# Patient Record
Sex: MALE | Race: WHITE | Employment: OTHER | URBAN - METROPOLITAN AREA
[De-identification: names, ages, dates, MRNs, and addresses within clinical notes are randomized per-mention and may not be internally consistent; named-entity substitution may affect disease eponyms.]

---

## 2017-06-13 ENCOUNTER — HOSPITAL ENCOUNTER (OUTPATIENT)
Dept: SURGERY | Age: 82
Discharge: HOME | End: 2017-06-13
Payer: MEDICARE

## 2017-06-13 VITALS
SYSTOLIC BLOOD PRESSURE: 168 MMHG | DIASTOLIC BLOOD PRESSURE: 103 MMHG | HEIGHT: 72 IN | OXYGEN SATURATION: 93 % | BODY MASS INDEX: 24.72 KG/M2 | RESPIRATION RATE: 16 BRPM | HEART RATE: 79 BPM | WEIGHT: 182.5 LBS | TEMPERATURE: 97.7 F

## 2017-06-13 LAB
AMORPH CRY URNS QL MICRO: NORMAL
ANION GAP BLD CALC-SCNC: 7 MMOL/L (ref 7–16)
APPEARANCE UR: ABNORMAL
BACTERIA URNS QL MICRO: NORMAL /HPF
BILIRUB UR QL: NEGATIVE
BUN SERPL-MCNC: 16 MG/DL (ref 8–23)
CALCIUM SERPL-MCNC: 9 MG/DL (ref 8.3–10.4)
CASTS URNS QL MICRO: 0 /LPF
CHLORIDE SERPL-SCNC: 100 MMOL/L (ref 98–107)
CO2 SERPL-SCNC: 30 MMOL/L (ref 21–32)
COLOR UR: ABNORMAL
CREAT SERPL-MCNC: 0.68 MG/DL (ref 0.8–1.5)
CRYSTALS URNS QL MICRO: NORMAL /LPF
EPI CELLS #/AREA URNS HPF: NORMAL /HPF
ERYTHROCYTE [DISTWIDTH] IN BLOOD BY AUTOMATED COUNT: 13.7 % (ref 11.9–14.6)
GLUCOSE SERPL-MCNC: 125 MG/DL (ref 65–100)
GLUCOSE UR STRIP.AUTO-MCNC: NEGATIVE MG/DL
HCT VFR BLD AUTO: 43.2 % (ref 41.1–50.3)
HGB BLD-MCNC: 14.3 G/DL (ref 13.6–17.2)
HGB UR QL STRIP: ABNORMAL
KETONES UR QL STRIP.AUTO: NEGATIVE MG/DL
LEUKOCYTE ESTERASE UR QL STRIP.AUTO: ABNORMAL
MCH RBC QN AUTO: 30.5 PG (ref 26.1–32.9)
MCHC RBC AUTO-ENTMCNC: 33.1 G/DL (ref 31.4–35)
MCV RBC AUTO: 92.1 FL (ref 79.6–97.8)
MUCOUS THREADS URNS QL MICRO: 0 /LPF
NITRITE UR QL STRIP.AUTO: NEGATIVE
OTHER OBSERVATIONS,UCOM: NORMAL
PH UR STRIP: 5.5 [PH] (ref 5–9)
PLATELET # BLD AUTO: 263 K/UL (ref 150–450)
PMV BLD AUTO: 12 FL (ref 10.8–14.1)
POTASSIUM SERPL-SCNC: 3.7 MMOL/L (ref 3.5–5.1)
PROT UR STRIP-MCNC: 30 MG/DL
RBC # BLD AUTO: 4.69 M/UL (ref 4.23–5.67)
RBC #/AREA URNS HPF: NORMAL /HPF
SODIUM SERPL-SCNC: 137 MMOL/L (ref 136–145)
SP GR UR REFRACTOMETRY: 1.02 (ref 1–1.02)
UROBILINOGEN UR QL STRIP.AUTO: 0.2 EU/DL (ref 0.2–1)
WBC # BLD AUTO: 3.8 K/UL (ref 4.3–11.1)
WBC URNS QL MICRO: NORMAL /HPF

## 2017-06-13 PROCEDURE — 81001 URINALYSIS AUTO W/SCOPE: CPT | Performed by: UROLOGY

## 2017-06-13 PROCEDURE — 87086 URINE CULTURE/COLONY COUNT: CPT | Performed by: UROLOGY

## 2017-06-13 PROCEDURE — 80048 BASIC METABOLIC PNL TOTAL CA: CPT | Performed by: UROLOGY

## 2017-06-13 PROCEDURE — 81015 MICROSCOPIC EXAM OF URINE: CPT | Performed by: UROLOGY

## 2017-06-13 PROCEDURE — 85027 COMPLETE CBC AUTOMATED: CPT | Performed by: UROLOGY

## 2017-06-13 PROCEDURE — 93005 ELECTROCARDIOGRAM TRACING: CPT | Performed by: ANESTHESIOLOGY

## 2017-06-13 NOTE — PERIOP NOTES
Recent Results (from the past 12 hour(s))   URINALYSIS W/ RFLX MICROSCOPIC    Collection Time: 06/13/17  9:20 AM   Result Value Ref Range    Color MECHELLE      Appearance CLOUDY      Specific gravity 1.021 1.001 - 1.023      pH (UA) 5.5 5.0 - 9.0      Protein 30 (A) NEG mg/dL    Glucose NEGATIVE  mg/dL    Ketone NEGATIVE  NEG mg/dL    Bilirubin NEGATIVE  NEG      Blood LARGE (A) NEG      Urobilinogen 0.2 0.2 - 1.0 EU/dL    Nitrites NEGATIVE  NEG      Leukocyte Esterase SMALL (A) NEG     URINE MICROSCOPIC    Collection Time: 06/13/17  9:20 AM   Result Value Ref Range    WBC 10-20 0 /hpf    RBC 10-20 0 /hpf    Epithelial cells 0-3 0 /hpf    Bacteria TRACE 0 /hpf    Casts 0 0 /lpf    Crystals, urine CA OXALATE 0 /LPF    Amorphous Crystals TRACE 0      Mucus 0 0 /lpf    Other observations RESULTS VERIFIED MANUALLY     CBC W/O DIFF    Collection Time: 06/13/17  9:25 AM   Result Value Ref Range    WBC 3.8 (L) 4.3 - 11.1 K/uL    RBC 4.69 4.23 - 5.67 M/uL    HGB 14.3 13.6 - 17.2 g/dL    HCT 43.2 41.1 - 50.3 %    MCV 92.1 79.6 - 97.8 FL    MCH 30.5 26.1 - 32.9 PG    MCHC 33.1 31.4 - 35.0 g/dL    RDW 13.7 11.9 - 14.6 %    PLATELET 393 372 - 670 K/uL    MPV 12.0 10.8 - 06.7 FL   METABOLIC PANEL, BASIC    Collection Time: 06/13/17  9:25 AM   Result Value Ref Range    Sodium 137 136 - 145 mmol/L    Potassium 3.7 3.5 - 5.1 mmol/L    Chloride 100 98 - 107 mmol/L    CO2 30 21 - 32 mmol/L    Anion gap 7 7 - 16 mmol/L    Glucose 125 (H) 65 - 100 mg/dL    BUN 16 8 - 23 MG/DL    Creatinine 0.68 (L) 0.8 - 1.5 MG/DL    GFR est AA >60 >60 ml/min/1.73m2    GFR est non-AA >60 >60 ml/min/1.73m2    Calcium 9.0 8.3 - 10.4 MG/DL

## 2017-06-13 NOTE — PERIOP NOTES
Patient verified name, , and surgery as listed in [facility]. TYPE  CASE:2  Orders per surgeon:   Received  Labs per surgeon:cbc, bmp, urine, urine culture: results pending  Labs per anesthesia protocol: type and screen on the dos   EKG  :  Today per grid. To desk for review per anesthesia. Charge nurse to fax for request for records from [facility]. Patient provided with handouts including guide to surgery , transfusions, pain management and hand hygiene for the family and community. Pt verbalizes understanding of all pre-op instructions . Instructed that family must be present in building at all times. Nothing to eat or drink after midnight the night prior to surgery. Hibiclens and instructions given per hospital policy. Instructed patient to continue  previous medications as prescribed prior to surgery and  to take the following medications the day of surgery according to anesthesia guidelines : albuterol, ampicillin, welchol and omeprazole       Original medication prescription bottles not visualized during patient appointment. Continue all previous medications unless otherwise directed. Instructed patient to hold  the following medications prior to surgery: aspirin as of 17 per Dr. Gregory Ceballos      Patient verbalized understanding of all instructions and provided all medical/health information to the best of their ability.

## 2017-06-13 NOTE — PERIOP NOTES
ekg from today approved per Dr. Kathryn Packer.  To problem chart area, pending records from [facility]

## 2017-06-14 LAB
ATRIAL RATE: 77 BPM
CALCULATED P AXIS, ECG09: 83 DEGREES
CALCULATED R AXIS, ECG10: -58 DEGREES
CALCULATED T AXIS, ECG11: 43 DEGREES
DIAGNOSIS, 93000: NORMAL
P-R INTERVAL, ECG05: 192 MS
Q-T INTERVAL, ECG07: 406 MS
QRS DURATION, ECG06: 110 MS
QTC CALCULATION (BEZET), ECG08: 459 MS
VENTRICULAR RATE, ECG03: 77 BPM

## 2017-06-14 NOTE — PERIOP NOTES
[facility] records to chart, ekg and echo and last office note. Urine culture preliminary , no growth.  Noted

## 2017-06-15 ENCOUNTER — ANESTHESIA EVENT (OUTPATIENT)
Dept: SURGERY | Age: 82
DRG: 713 | End: 2017-06-15
Payer: MEDICARE

## 2017-06-15 LAB
BACTERIA SPEC CULT: NORMAL
SERVICE CMNT-IMP: NORMAL

## 2017-06-16 ENCOUNTER — ANESTHESIA (OUTPATIENT)
Dept: SURGERY | Age: 82
DRG: 713 | End: 2017-06-16
Payer: MEDICARE

## 2017-06-16 ENCOUNTER — HOSPITAL ENCOUNTER (INPATIENT)
Age: 82
LOS: 1 days | Discharge: HOME | DRG: 713 | End: 2017-06-19
Attending: UROLOGY | Admitting: UROLOGY
Payer: MEDICARE

## 2017-06-16 LAB
ABO + RH BLD: NORMAL
ANION GAP BLD CALC-SCNC: 6 MMOL/L (ref 7–16)
BLOOD GROUP ANTIBODIES SERPL: NORMAL
BUN SERPL-MCNC: 12 MG/DL (ref 8–23)
CALCIUM SERPL-MCNC: 8.6 MG/DL (ref 8.3–10.4)
CHLORIDE SERPL-SCNC: 103 MMOL/L (ref 98–107)
CO2 SERPL-SCNC: 30 MMOL/L (ref 21–32)
CREAT SERPL-MCNC: 0.65 MG/DL (ref 0.8–1.5)
GLUCOSE SERPL-MCNC: 150 MG/DL (ref 65–100)
HCT VFR BLD AUTO: 43.1 % (ref 41.1–50.3)
HGB BLD-MCNC: 14.3 G/DL (ref 13.6–17.2)
POTASSIUM SERPL-SCNC: 4 MMOL/L (ref 3.5–5.1)
SODIUM SERPL-SCNC: 139 MMOL/L (ref 136–145)
SPECIMEN EXP DATE BLD: NORMAL

## 2017-06-16 PROCEDURE — 74011250636 HC RX REV CODE- 250/636: Performed by: UROLOGY

## 2017-06-16 PROCEDURE — 77030018846 HC SOL IRR STRL H20 ICUM -A

## 2017-06-16 PROCEDURE — 77030018830 HC SOL IRR GLYC ICUM-A: Performed by: UROLOGY

## 2017-06-16 PROCEDURE — 77030010545

## 2017-06-16 PROCEDURE — 76060000035 HC ANESTHESIA 2 TO 2.5 HR: Performed by: UROLOGY

## 2017-06-16 PROCEDURE — 74011000250 HC RX REV CODE- 250

## 2017-06-16 PROCEDURE — 86900 BLOOD TYPING SEROLOGIC ABO: CPT | Performed by: ANESTHESIOLOGY

## 2017-06-16 PROCEDURE — 77030008477 HC STYL SATN SLP COVD -A: Performed by: ANESTHESIOLOGY

## 2017-06-16 PROCEDURE — 77030019927 HC TBNG IRR CYSTO BAXT -A: Performed by: UROLOGY

## 2017-06-16 PROCEDURE — 0VB08ZZ EXCISION OF PROSTATE, VIA NATURAL OR ARTIFICIAL OPENING ENDOSCOPIC: ICD-10-PCS | Performed by: UROLOGY

## 2017-06-16 PROCEDURE — 76010000131 HC OR TIME 2 TO 2.5 HR: Performed by: UROLOGY

## 2017-06-16 PROCEDURE — 74011250637 HC RX REV CODE- 250/637: Performed by: UROLOGY

## 2017-06-16 PROCEDURE — 74011250636 HC RX REV CODE- 250/636: Performed by: ANESTHESIOLOGY

## 2017-06-16 PROCEDURE — 80048 BASIC METABOLIC PNL TOTAL CA: CPT | Performed by: UROLOGY

## 2017-06-16 PROCEDURE — 77030008703 HC TU ET UNCUF COVD -A: Performed by: ANESTHESIOLOGY

## 2017-06-16 PROCEDURE — 77030018846 HC SOL IRR STRL H20 ICUM -A: Performed by: UROLOGY

## 2017-06-16 PROCEDURE — 77030011640 HC PAD GRND REM COVD -A: Performed by: UROLOGY

## 2017-06-16 PROCEDURE — 77030020143 HC AIRWY LARYN INTUB CGAS -A: Performed by: ANESTHESIOLOGY

## 2017-06-16 PROCEDURE — 77030005546 HC CATH URETH FOL 3W BARD -A: Performed by: UROLOGY

## 2017-06-16 PROCEDURE — 74011250636 HC RX REV CODE- 250/636

## 2017-06-16 PROCEDURE — 85018 HEMOGLOBIN: CPT | Performed by: UROLOGY

## 2017-06-16 PROCEDURE — 76210000063 HC OR PH I REC FIRST 0.5 HR: Performed by: UROLOGY

## 2017-06-16 PROCEDURE — 74011000258 HC RX REV CODE- 258: Performed by: UROLOGY

## 2017-06-16 PROCEDURE — 36415 COLL VENOUS BLD VENIPUNCTURE: CPT | Performed by: UROLOGY

## 2017-06-16 PROCEDURE — 77030018836 HC SOL IRR NACL ICUM -A

## 2017-06-16 PROCEDURE — 77030032490 HC SLV COMPR SCD KNE COVD -B: Performed by: UROLOGY

## 2017-06-16 PROCEDURE — 77030018836 HC SOL IRR NACL ICUM -A: Performed by: UROLOGY

## 2017-06-16 PROCEDURE — 94760 N-INVAS EAR/PLS OXIMETRY 1: CPT

## 2017-06-16 PROCEDURE — 77030010545: Performed by: UROLOGY

## 2017-06-16 PROCEDURE — 77030005206: Performed by: UROLOGY

## 2017-06-16 PROCEDURE — 77030019940 HC BLNKT HYPOTHRM STRY -B: Performed by: ANESTHESIOLOGY

## 2017-06-16 PROCEDURE — 88305 TISSUE EXAM BY PATHOLOGIST: CPT | Performed by: UROLOGY

## 2017-06-16 RX ORDER — CIPROFLOXACIN 2 MG/ML
400 INJECTION, SOLUTION INTRAVENOUS ONCE
Status: COMPLETED | OUTPATIENT
Start: 2017-06-16 | End: 2017-06-16

## 2017-06-16 RX ORDER — FENTANYL CITRATE 50 UG/ML
INJECTION, SOLUTION INTRAMUSCULAR; INTRAVENOUS AS NEEDED
Status: DISCONTINUED | OUTPATIENT
Start: 2017-06-16 | End: 2017-06-16 | Stop reason: HOSPADM

## 2017-06-16 RX ORDER — FLUMAZENIL 0.1 MG/ML
0.2 INJECTION INTRAVENOUS
Status: DISCONTINUED | OUTPATIENT
Start: 2017-06-16 | End: 2017-06-19 | Stop reason: HOSPADM

## 2017-06-16 RX ORDER — TAMSULOSIN HYDROCHLORIDE 0.4 MG/1
0.4 CAPSULE ORAL
Status: DISCONTINUED | OUTPATIENT
Start: 2017-06-16 | End: 2017-06-19 | Stop reason: HOSPADM

## 2017-06-16 RX ORDER — ONDANSETRON 2 MG/ML
4 INJECTION INTRAMUSCULAR; INTRAVENOUS
Status: DISCONTINUED | OUTPATIENT
Start: 2017-06-16 | End: 2017-06-19 | Stop reason: HOSPADM

## 2017-06-16 RX ORDER — SODIUM CHLORIDE, SODIUM LACTATE, POTASSIUM CHLORIDE, CALCIUM CHLORIDE 600; 310; 30; 20 MG/100ML; MG/100ML; MG/100ML; MG/100ML
100 INJECTION, SOLUTION INTRAVENOUS CONTINUOUS
Status: DISCONTINUED | OUTPATIENT
Start: 2017-06-16 | End: 2017-06-16 | Stop reason: HOSPADM

## 2017-06-16 RX ORDER — ALBUTEROL SULFATE 90 UG/1
2 AEROSOL, METERED RESPIRATORY (INHALATION) 2 TIMES DAILY
Status: DISCONTINUED | OUTPATIENT
Start: 2017-06-16 | End: 2017-06-19 | Stop reason: HOSPADM

## 2017-06-16 RX ORDER — NALBUPHINE HYDROCHLORIDE 20 MG/ML
5 INJECTION, SOLUTION INTRAMUSCULAR; INTRAVENOUS; SUBCUTANEOUS
Status: DISCONTINUED | OUTPATIENT
Start: 2017-06-16 | End: 2017-06-19 | Stop reason: HOSPADM

## 2017-06-16 RX ORDER — COLESEVELAM 180 1/1
TABLET ORAL 3 TIMES DAILY
Status: DISCONTINUED | OUTPATIENT
Start: 2017-06-16 | End: 2017-06-19 | Stop reason: HOSPADM

## 2017-06-16 RX ORDER — DEXAMETHASONE SODIUM PHOSPHATE 4 MG/ML
INJECTION, SOLUTION INTRA-ARTICULAR; INTRALESIONAL; INTRAMUSCULAR; INTRAVENOUS; SOFT TISSUE AS NEEDED
Status: DISCONTINUED | OUTPATIENT
Start: 2017-06-16 | End: 2017-06-16 | Stop reason: HOSPADM

## 2017-06-16 RX ORDER — HYDROMORPHONE HYDROCHLORIDE 2 MG/ML
0.5 INJECTION, SOLUTION INTRAMUSCULAR; INTRAVENOUS; SUBCUTANEOUS
Status: DISCONTINUED | OUTPATIENT
Start: 2017-06-16 | End: 2017-06-19 | Stop reason: HOSPADM

## 2017-06-16 RX ORDER — FINASTERIDE 5 MG/1
5 TABLET, FILM COATED ORAL DAILY
Status: DISCONTINUED | OUTPATIENT
Start: 2017-06-17 | End: 2017-06-19 | Stop reason: HOSPADM

## 2017-06-16 RX ORDER — SODIUM CHLORIDE 0.9 % (FLUSH) 0.9 %
5-10 SYRINGE (ML) INJECTION AS NEEDED
Status: DISCONTINUED | OUTPATIENT
Start: 2017-06-16 | End: 2017-06-19 | Stop reason: HOSPADM

## 2017-06-16 RX ORDER — DOCUSATE SODIUM 100 MG/1
100 CAPSULE, LIQUID FILLED ORAL 2 TIMES DAILY
Status: DISCONTINUED | OUTPATIENT
Start: 2017-06-16 | End: 2017-06-19 | Stop reason: HOSPADM

## 2017-06-16 RX ORDER — DIPHENHYDRAMINE HYDROCHLORIDE 50 MG/ML
12.5 INJECTION, SOLUTION INTRAMUSCULAR; INTRAVENOUS
Status: DISCONTINUED | OUTPATIENT
Start: 2017-06-16 | End: 2017-06-19 | Stop reason: HOSPADM

## 2017-06-16 RX ORDER — SODIUM CHLORIDE 0.9 % (FLUSH) 0.9 %
5-10 SYRINGE (ML) INJECTION EVERY 8 HOURS
Status: DISCONTINUED | OUTPATIENT
Start: 2017-06-16 | End: 2017-06-16 | Stop reason: HOSPADM

## 2017-06-16 RX ORDER — LIDOCAINE HYDROCHLORIDE 20 MG/ML
INJECTION, SOLUTION EPIDURAL; INFILTRATION; INTRACAUDAL; PERINEURAL AS NEEDED
Status: DISCONTINUED | OUTPATIENT
Start: 2017-06-16 | End: 2017-06-16 | Stop reason: HOSPADM

## 2017-06-16 RX ORDER — CEFAZOLIN SODIUM IN 0.9 % NACL 2 G/50 ML
2 INTRAVENOUS SOLUTION, PIGGYBACK (ML) INTRAVENOUS EVERY 8 HOURS
Status: COMPLETED | OUTPATIENT
Start: 2017-06-16 | End: 2017-06-17

## 2017-06-16 RX ORDER — PROPOFOL 10 MG/ML
INJECTION, EMULSION INTRAVENOUS AS NEEDED
Status: DISCONTINUED | OUTPATIENT
Start: 2017-06-16 | End: 2017-06-16 | Stop reason: HOSPADM

## 2017-06-16 RX ORDER — NALOXONE HYDROCHLORIDE 0.4 MG/ML
0.1 INJECTION, SOLUTION INTRAMUSCULAR; INTRAVENOUS; SUBCUTANEOUS
Status: DISCONTINUED | OUTPATIENT
Start: 2017-06-16 | End: 2017-06-19 | Stop reason: HOSPADM

## 2017-06-16 RX ORDER — ONDANSETRON 2 MG/ML
INJECTION INTRAMUSCULAR; INTRAVENOUS AS NEEDED
Status: DISCONTINUED | OUTPATIENT
Start: 2017-06-16 | End: 2017-06-16 | Stop reason: HOSPADM

## 2017-06-16 RX ORDER — DEXTROSE MONOHYDRATE AND SODIUM CHLORIDE 5; .9 G/100ML; G/100ML
75 INJECTION, SOLUTION INTRAVENOUS CONTINUOUS
Status: DISCONTINUED | OUTPATIENT
Start: 2017-06-16 | End: 2017-06-17

## 2017-06-16 RX ORDER — SODIUM CHLORIDE 0.9 % (FLUSH) 0.9 %
5-10 SYRINGE (ML) INJECTION AS NEEDED
Status: DISCONTINUED | OUTPATIENT
Start: 2017-06-16 | End: 2017-06-16 | Stop reason: HOSPADM

## 2017-06-16 RX ORDER — SUCCINYLCHOLINE CHLORIDE 20 MG/ML
INJECTION INTRAMUSCULAR; INTRAVENOUS AS NEEDED
Status: DISCONTINUED | OUTPATIENT
Start: 2017-06-16 | End: 2017-06-16 | Stop reason: HOSPADM

## 2017-06-16 RX ORDER — ROCURONIUM BROMIDE 10 MG/ML
INJECTION, SOLUTION INTRAVENOUS AS NEEDED
Status: DISCONTINUED | OUTPATIENT
Start: 2017-06-16 | End: 2017-06-16 | Stop reason: HOSPADM

## 2017-06-16 RX ORDER — ACETAMINOPHEN 325 MG/1
650 TABLET ORAL
Status: DISCONTINUED | OUTPATIENT
Start: 2017-06-16 | End: 2017-06-19 | Stop reason: HOSPADM

## 2017-06-16 RX ORDER — HYDROCODONE BITARTRATE AND ACETAMINOPHEN 7.5; 325 MG/1; MG/1
1 TABLET ORAL
Status: DISCONTINUED | OUTPATIENT
Start: 2017-06-16 | End: 2017-06-19 | Stop reason: HOSPADM

## 2017-06-16 RX ORDER — LISINOPRIL AND HYDROCHLOROTHIAZIDE 10; 12.5 MG/1; MG/1
1 TABLET ORAL
Status: DISCONTINUED | OUTPATIENT
Start: 2017-06-17 | End: 2017-06-18

## 2017-06-16 RX ORDER — METOPROLOL SUCCINATE 25 MG/1
25 TABLET, EXTENDED RELEASE ORAL
Status: DISCONTINUED | OUTPATIENT
Start: 2017-06-16 | End: 2017-06-18

## 2017-06-16 RX ORDER — PANTOPRAZOLE SODIUM 40 MG/1
40 TABLET, DELAYED RELEASE ORAL
Status: DISCONTINUED | OUTPATIENT
Start: 2017-06-17 | End: 2017-06-19 | Stop reason: HOSPADM

## 2017-06-16 RX ORDER — OXYCODONE HYDROCHLORIDE 5 MG/1
5 TABLET ORAL
Status: DISCONTINUED | OUTPATIENT
Start: 2017-06-16 | End: 2017-06-19 | Stop reason: HOSPADM

## 2017-06-16 RX ORDER — HYDROMORPHONE HYDROCHLORIDE 1 MG/ML
1 INJECTION, SOLUTION INTRAMUSCULAR; INTRAVENOUS; SUBCUTANEOUS
Status: DISCONTINUED | OUTPATIENT
Start: 2017-06-16 | End: 2017-06-19 | Stop reason: HOSPADM

## 2017-06-16 RX ORDER — LIDOCAINE HYDROCHLORIDE 10 MG/ML
0.1 INJECTION INFILTRATION; PERINEURAL AS NEEDED
Status: DISCONTINUED | OUTPATIENT
Start: 2017-06-16 | End: 2017-06-16 | Stop reason: HOSPADM

## 2017-06-16 RX ORDER — SODIUM CHLORIDE 0.9 % (FLUSH) 0.9 %
5-10 SYRINGE (ML) INJECTION EVERY 8 HOURS
Status: DISCONTINUED | OUTPATIENT
Start: 2017-06-16 | End: 2017-06-19 | Stop reason: HOSPADM

## 2017-06-16 RX ADMIN — SODIUM CHLORIDE, SODIUM LACTATE, POTASSIUM CHLORIDE, AND CALCIUM CHLORIDE: 600; 310; 30; 20 INJECTION, SOLUTION INTRAVENOUS at 08:43

## 2017-06-16 RX ADMIN — CEFAZOLIN 2 G: 1 INJECTION, POWDER, FOR SOLUTION INTRAMUSCULAR; INTRAVENOUS; PARENTERAL at 16:00

## 2017-06-16 RX ADMIN — CEFAZOLIN 2 G: 1 INJECTION, POWDER, FOR SOLUTION INTRAMUSCULAR; INTRAVENOUS; PARENTERAL at 23:36

## 2017-06-16 RX ADMIN — FENTANYL CITRATE 50 MCG: 50 INJECTION, SOLUTION INTRAMUSCULAR; INTRAVENOUS at 08:52

## 2017-06-16 RX ADMIN — Medication 10 ML: at 13:52

## 2017-06-16 RX ADMIN — CIPROFLOXACIN 400 MG: 2 INJECTION, SOLUTION INTRAVENOUS at 07:57

## 2017-06-16 RX ADMIN — DOCUSATE SODIUM 100 MG: 100 CAPSULE, LIQUID FILLED ORAL at 17:02

## 2017-06-16 RX ADMIN — FENTANYL CITRATE 50 MCG: 50 INJECTION, SOLUTION INTRAMUSCULAR; INTRAVENOUS at 08:02

## 2017-06-16 RX ADMIN — FENTANYL CITRATE 50 MCG: 50 INJECTION, SOLUTION INTRAMUSCULAR; INTRAVENOUS at 08:10

## 2017-06-16 RX ADMIN — ROCURONIUM BROMIDE 5 MG: 10 INJECTION, SOLUTION INTRAVENOUS at 08:07

## 2017-06-16 RX ADMIN — TAMSULOSIN HYDROCHLORIDE 0.4 MG: 0.4 CAPSULE ORAL at 21:27

## 2017-06-16 RX ADMIN — PROPOFOL 50 MG: 10 INJECTION, EMULSION INTRAVENOUS at 08:07

## 2017-06-16 RX ADMIN — SODIUM CHLORIDE, SODIUM LACTATE, POTASSIUM CHLORIDE, AND CALCIUM CHLORIDE 100 ML/HR: 600; 310; 30; 20 INJECTION, SOLUTION INTRAVENOUS at 06:30

## 2017-06-16 RX ADMIN — ACETAMINOPHEN 325 MG: 325 TABLET, FILM COATED ORAL at 21:26

## 2017-06-16 RX ADMIN — SUCCINYLCHOLINE CHLORIDE 120 MG: 20 INJECTION INTRAMUSCULAR; INTRAVENOUS at 08:07

## 2017-06-16 RX ADMIN — DEXAMETHASONE SODIUM PHOSPHATE 4 MG: 4 INJECTION, SOLUTION INTRA-ARTICULAR; INTRALESIONAL; INTRAMUSCULAR; INTRAVENOUS; SOFT TISSUE at 08:38

## 2017-06-16 RX ADMIN — PROPOFOL 150 MG: 10 INJECTION, EMULSION INTRAVENOUS at 08:04

## 2017-06-16 RX ADMIN — ONDANSETRON 4 MG: 2 INJECTION INTRAMUSCULAR; INTRAVENOUS at 09:44

## 2017-06-16 RX ADMIN — DEXTROSE MONOHYDRATE AND SODIUM CHLORIDE 75 ML/HR: 5; .9 INJECTION, SOLUTION INTRAVENOUS at 13:52

## 2017-06-16 RX ADMIN — SUCCINYLCHOLINE CHLORIDE 40 MG: 20 INJECTION INTRAMUSCULAR; INTRAVENOUS at 08:52

## 2017-06-16 RX ADMIN — METOPROLOL SUCCINATE 25 MG: 25 TABLET, EXTENDED RELEASE ORAL at 17:02

## 2017-06-16 RX ADMIN — COLESEVELAM: 180 TABLET ORAL at 22:00

## 2017-06-16 RX ADMIN — Medication 5 ML: at 22:00

## 2017-06-16 RX ADMIN — LIDOCAINE HYDROCHLORIDE 100 MG: 20 INJECTION, SOLUTION EPIDURAL; INFILTRATION; INTRACAUDAL; PERINEURAL at 08:04

## 2017-06-16 NOTE — ANESTHESIA PREPROCEDURE EVALUATION
Anesthetic History   No history of anesthetic complications            Review of Systems / Medical History  Patient summary reviewed and pertinent labs reviewed    Pulmonary    COPD (had exacerbation/hospitalization 1 year ago when he was initially diagnosed, no subsequent problems. Uses BID inhalers.): mild            Comments: Former smoker. Quit 30 years ago.    Neuro/Psych   Within defined limits           Cardiovascular    Hypertension: well controlled          Hyperlipidemia    Exercise tolerance: >4 METS     GI/Hepatic/Renal  Within defined limits              Endo/Other  Within defined limits           Other Findings              Physical Exam    Airway  Mallampati: II  TM Distance: > 6 cm  Neck ROM: normal range of motion   Mouth opening: Normal     Cardiovascular  Regular rate and rhythm,  S1 and S2 normal,  no murmur, click, rub, or gallop             Dental    Dentition: Edentulous and Full upper dentures     Pulmonary  Breath sounds clear to auscultation               Abdominal  GI exam deferred       Other Findings            Anesthetic Plan    ASA: 3  Anesthesia type: general          Induction: Intravenous  Anesthetic plan and risks discussed with: Patient

## 2017-06-16 NOTE — PERIOP NOTES
Report to and care turned over to [place], [address] [facility]

## 2017-06-16 NOTE — PROGRESS NOTES
Pt encourage to ambulate this shift or tonight. States he wants to wait until the morning, he just doesn't feel up to it right now.

## 2017-06-16 NOTE — IP AVS SNAPSHOT
[address] 
[phone] Patient: Moy Stubbs MRN: [id number] TZX:[birth year] You are allergic to the following Allergen Reactions Bee Venom Protein (Honey Bee) Swelling Recent Documentation Height Weight BMI Smoking Status 1.829 m 81.6 kg 24.41 kg/m2 Former Smoker Emergency Contacts Name Discharge Info Relation Home Work Mobile Christian Pascual  Child [2] [phone] About your hospitalization You were admitted on:  June 16, 2017 You last received care in the:  [facility] 6 MED SURG You were discharged on:  June 19, 2017 Unit phone number:  [phone] Why you were hospitalized Your primary diagnosis was:  Not on File Your diagnoses also included:  S/P Turp Providers Seen During Your Hospitalizations Provider Role Specialty Primary office phone Dangelo Buenrostro MD Attending Provider Urology [phone] Your Primary Care Physician (PCP) Primary Care Physician Office Phone Office Fax [address] [phone] Follow-up Information Follow up With Details Comments Contact Info Rod Garcia MD   [address] [facility] [address] [facility] [address] 
[phone] Dangelo Buenrostro MD On 7/12/2017 at 9:40, [address] [facility] [address] 
[phone] Your Appointments Wednesday July 12, 2017  9:40 AM EDT Office Visit with Dangelo Buenrostro MD  
[facility] [address] [address] [place] [address] [address]   
[phone] Current Discharge Medication List  
  
START taking these medications Dose & Instructions Dispensing Information Comments Morning Noon Evening Bedtime  
 finasteride 5 mg tablet Commonly known as:  PROSCAR Your next dose is:  6/20 Dose:  5 mg Take 1 Tab by mouth daily. Quantity:  10 Tab Refills:  0 phenazopyridine 200 mg tablet Commonly known as:  PYRIDIUM Your next dose is:  As needed Dose:  200 mg Take 1 Tab by mouth three (3) times daily as needed for Pain for up to 3 days. Indications: Dysuria Quantity:  9 Tab Refills:  0 CONTINUE these medications which have CHANGED Dose & Instructions Dispensing Information Comments Morning Noon Evening Bedtime * tamsulosin 0.4 mg capsule Commonly known as:  FLOMAX What changed:  See the new instructions. Your next dose is:  6/20 TAKE 1 CAPSULE BY MOUTH ONCE DAILY Quantity:  90 Cap Refills:  0  
     
   
   
   
  
 * tamsulosin 0.4 mg capsule Commonly known as:  FLOMAX What changed:  Another medication with the same name was changed. Make sure you understand how and when to take each. Dose:  0.4 mg Take 1 Cap by mouth nightly for 90 days. Quantity:  90 Cap Refills:  4  
     
   
   
   
  
 * Notice: This list has 2 medication(s) that are the same as other medications prescribed for you. Read the directions carefully, and ask your doctor or other care provider to review them with you. CONTINUE these medications which have NOT CHANGED Dose & Instructions Dispensing Information Comments Morning Noon Evening Bedtime  
 albuterol 90 mcg/actuation inhaler Commonly known as:  PROVENTIL HFA, VENTOLIN HFA, PROAIR HFA Your next dose is: Today before dinner Dose:  2 Puff Take 2 Puffs by inhalation two (2) times a day. Refills:  0  
     
   
   
  
   
  
 ampicillin 500 mg capsule Commonly known as:  PRINCIPEN Your next dose is: Today before lunch and dinner Dose:  500 mg Take 1 Cap by mouth Before breakfast, lunch, dinner and at bedtime. Quantity:  30 Cap Refills:  0 METOPROLOL TARTRATE PO Your next dose is:  6/20 Dose:  25 mg Take 25 mg by mouth every evening. Refills:  0  
     
   
   
   
  
 omeprazole 20 mg capsule Commonly known as:  PRILOSEC Your next dose is:  6/20 Dose:  20 mg Take 20 mg by mouth every morning. Indications: gastroesophageal reflux disease Refills:  0 WELCHOL 625 mg tablet Generic drug:  colesevelam  
Your next dose is: Today with lunch and dinner Dose:  625 mg Take 625 mg by mouth three (3) times daily. Refills:  0 ZESTORETIC 10-12.5 mg per tablet Generic drug:  lisinopril-hydroCHLOROthiazide Your next dose is:  6/20 Dose:  1 Tab Take 1 Tab by mouth every morning. Refills:  0 STOP taking these medications   
 aspirin delayed-release 81 mg tablet Where to Get Your Medications These medications were sent to [address] [place] [address]  [address] [address] Phone:  [phone]  
  finasteride 5 mg tablet  
 phenazopyridine 200 mg tablet Discharge Instructions DISCHARGE SUMMARY from Nurse The following personal items are in your possession at time of discharge: 
 
Dental Appliances: Uppers Visual Aid: None Home Medications: None Jewelry: None Clothing: Shirt, Pants, Footwear, Undergarments Other Valuables: None PATIENT INSTRUCTIONS: 
 
 
F-face looks uneven A-arms unable to move or move unevenly S-speech slurred or non-existent T-time-call 911 as soon as signs and symptoms begin-DO NOT go Back to bed or wait to see if you get better-TIME IS BRAIN. Warning Signs of HEART ATTACK Call 911 if you have these symptoms: 
? Chest discomfort. Most heart attacks involve discomfort in the center of the chest that lasts more than a few minutes, or that goes away and comes back. It can feel like uncomfortable pressure, squeezing, fullness, or pain. ? Discomfort in other areas of the upper body. Symptoms can include pain or discomfort in one or both arms, the back, neck, jaw, or stomach. ? Shortness of breath with or without chest discomfort. ? Other signs may include breaking out in a cold sweat, nausea, or lightheadedness. Don't wait more than five minutes to call [address]! Fast action can save your life. Calling 911 is almost always the fastest way to get lifesaving treatment. Emergency Medical Services staff can begin treatment when they arrive  up to an hour sooner than if someone gets to the hospital by car. The discharge information has been reviewed with the patient. The patient verbalized understanding. Discharge medications reviewed with the patient and appropriate educational materials and side effects teaching were provided. Discharge Instructions Attachments/References TURP: POST-OP (ENGLISH) Discharge Orders None Introducing [facility] SERVICES! Juan Pablo Donohue introduces [email or web address] patient portal. Now you can access parts of your medical record, email your doctor's office, and request medication refills online. 1. In your internet browser, go to [email or web address]://Medical Referral Source. [employer] 2. Click on the First Time User? Click Here link in the Sign In box. You will see the New Member Sign Up page. 3. Enter your [email or web address] Access Code exactly as it appears below. You will not need to use this code after youve completed the sign-up process.  If you do not sign up before the expiration date, you must request a new code. · [employer] Access Code: [email or web address] Expires: 7/12/2017  7:46 AM 
 
4. Enter the last four digits of your Social Security Number (xxxx) and Date of Birth (mm/dd/yyyy) as indicated and click Submit. You will be taken to the next sign-up page. 5. Create a [employer] ID. This will be your [employer] login ID and cannot be changed, so think of one that is secure and easy to remember. 6. Create a [employer] password. You can change your password at any time. 7. Enter your Password Reset Question and Answer. This can be used at a later time if you forget your password. 8. Enter your e-mail address. You will receive e-mail notification when new information is available in [address] 9. Click Sign Up. You can now view and download portions of your medical record. 10. Click the Download Summary menu link to download a portable copy of your medical information. If you have questions, please visit the Frequently Asked Questions section of the [employer] website. Remember, [employer] is NOT to be used for urgent needs. For medical emergencies, dial 911. Now available from your iPhone and Android! General Information Please provide this summary of care documentation to your next provider. Patient Signature:  ____________________________________________________________ Date:  ____________________________________________________________  
  
[facility] Provider Signature:  ____________________________________________________________ Date:  ____________________________________________________________ More Information Transurethral Resection of the Prostate (TURP): What to Expect at [facility] Your Recovery Transurethral resection of the prostate (TURP) is surgery to remove prostate tissue.  It is done when an overgrown prostate gland is pressing on the urethra and making it hard for a man to urinate. You may need a urinary catheter for a short time. It is a flexible plastic tube used to drain urine from your bladder when you can't urinate on your own. If it is still in place when you go home, your doctor will give you instructions on how to care for your catheter. For several days after surgery, you may feel burning when you urinate. Your urine may be pink for 1 to 3 weeks after surgery. You also may have bladder cramps, or spasms. Your doctor may give you medicine to help control the spasms. You may still feel like you need to urinate often in the weeks after your surgery. It often takes up to 6 weeks for this to get better. After you have healed, you may have less trouble urinating. You may have better control over starting and stopping your urine stream. And you may feel like you get more relief when you urinate. Most men can return to work or many of their usual tasks in 1 to 3 weeks. But for about 6 weeks, try to avoid heavy lifting and strenuous activities that might put extra pressure on your bladder. Most men still can have erections after surgery (if they were able to have them before surgery). But they may not ejaculate when they have an orgasm. Semen may go into the bladder instead of out through the penis. This is called retrograde ejaculation. It does not hurt and is not harmful to your health. This care sheet gives you a general idea about how long it will take for you to recover. But each person recovers at a different pace. Follow the steps below to get better as quickly as possible. How can you care for yourself at home? Activity · Rest when you feel tired. · Be active. Walking is a good choice. · Allow your body to heal. Don't move quickly or lift anything heavy until you are feeling better. · Ask your doctor when you can drive again.  
· Many people are able to return to work within 1 to 3 weeks after surgery. It depends on the type of work you do and how you feel. · Do not put anything in your rectum, such as an enema or suppository, for 4 to 6 weeks after the surgery. · You may shower and take baths when your doctor says it is okay. · Ask your doctor when it is okay for you to have sex. Diet · You can eat your normal diet. If your stomach is upset, try bland, low-fat foods like plain rice, broiled chicken, toast, and yogurt. · If your bowel movements are not regular right after surgery, try to avoid constipation and straining. Drink plenty of water. Your doctor may suggest fiber, a stool softener, or a mild laxative. Medicines · Your doctor will tell you if and when you can restart your medicines. He or she will also give you instructions about taking any new medicines. · If you take aspirin or some other blood thinner, be sure to talk to your doctor. He or she will tell you if and when to start taking those medicines again. Make sure that you understand exactly what your doctor wants you to do. · Be safe with medicines. Read and follow all instructions on the label. ¨ If the doctor gave you a prescription medicine for pain, take it as prescribed. ¨ If you are not taking a prescription pain medicine, ask your doctor if you can take an over-the-counter medicine. · Take your antibiotics as directed. Do not stop taking them just because you feel better. You need to take the full course of antibiotics. Follow-up care is a key part of your treatment and safety. Be sure to make and go to all appointments, and call your doctor if you are having problems. It's also a good idea to know your test results and keep a list of the medicines you take. When should you call for help? Call 911 anytime you think you may need emergency care. For example, call if: 
· You passed out (lost consciousness). · You have symptoms of a blood clot in your lung (called a pulmonary embolism). These may include: ¨ Sudden chest pain. ¨ Trouble breathing. ¨ Coughing up blood. Call your doctor now or seek immediate medical care if: 
· You have symptoms of infection, such as: 
¨ Increased pain, swelling, warmth, or redness around the cut. ¨ Red streaks leading from the cut. ¨ Pus draining from the cut. ¨ A fever. · You have new or more blood or blood clots in your urine. · You have pain in the flank, which is just below the rib cage and above the waist on either side of the back. · You have new or worse pain or burning when you urinate. · You cannot urinate or have trouble urinating. · You have a new or worse problem with controlling your bladder. · You have signs of a blood clot, such as: 
¨ Pain in your calf, back of the knee, thigh, or groin. ¨ Redness and swelling in your leg or groin. Watch closely for changes in your health, and be sure to contact your doctor if: 
· You are not getting better as expected. · You do not have a bowel movement after taking a laxative. Where can you learn more? Go to http://maria antonia-tiffanie.info/. Enter K276 in the search box to learn more about \"Transurethral Resection of the Prostate (TURP): What to Expect at Home. \" Current as of: October 31, 2016 Content Version: 11.2 © 9568-4250 [employer]. Care instructions adapted under license by [employer] (which disclaims liability or warranty for this information). If you have questions about a medical condition or this instruction, always ask your healthcare professional. [place] [address] any warranty or liability for your use of this information.

## 2017-06-16 NOTE — PROGRESS NOTES
Pt having bloody CBI drainage despite 5 liters of CBI irrigant. MD Jina Grant made aware. Will continue to monitor patient closely.

## 2017-06-16 NOTE — BRIEF OP NOTE
BRIEF OPERATIVE NOTE    Date of Procedure: 6/16/2017   Preoperative Diagnosis: Urinary retention [R33.9]  Postoperative Diagnosis: Urinary retention [R33.9]  BPH    Procedure(s):  TRANSURETHRAL RESECTION OF PROSTATE  Surgeon(s) and Role:     * Lorena Vega MD - Primary         Assistant Staff:       Surgical Staff:  Circ-1: Macy Clifford RN  Circ-2: Jazzmine Shah RN  Event Time In   Incision Start 9376   Incision Close 6832     Anesthesia: General   Estimated Blood Loss:200ml  Specimens:   ID Type Source Tests Collected by Time Destination   1 : prostate tissue  Preservative Prostate  Lorena Vega MD 6/16/2017 0935 Pathology      Findings: very large vascular prostate   Complications:0  Implants: * No implants in log *    Op note-I have discussed the medical therapy for  outlet obstruction from the prostate. I also discussed with the patient surgical alternatives such as a TURP, bipolar vaporization of the prostate, green light laser vaporization of the prostate. Risks not exclusive of death, infection, bleeding, incontinence, erectile dysfunction, bladder neck contracture, urethral stricture, retrograde ejaculation,and the possible need for additional procedures were all discussed with the patient. Patient states he understands and wishes to proceed with planned surgical intervention.   [id number]

## 2017-06-16 NOTE — OP NOTES
[address]   OPERATIVE REPORT       Name:  Tiffanie Morfin   MR#:  [id number]   :  1932   Account #:  [de-identified]   Date of Adm:  2017       DATE OF PROCEDURE 2017    PREOPERATIVE DIAGNOSIS: Urinary retention with benign prostatic   hypertrophy. POSTOPERATIVE DIAGNOSIS: Urinary retention with benign prostatic   hypertrophy. PROCEDURE PERFORMED: TURP. SURGEON: Osmel Salcido MD    ANESTHESIA: General.    INDICATIONS FOR PROCEDURE: The patient is an 77-year-old white   male with urinary retention and findings of a very large   prostate. Recommendation was for attempt at TURP. Risks,   benefits, and alternatives were discussed in detail. The patient   stated that he understood and wished to proceed. DESCRIPTION OF PROCEDURE: The patient received preoperative   antibiotics consisting of Cipro 400 mg IV piggyback. General   anesthesia was obtained. The patient was placed in the   dorsolithotomy position, prepped and draped sterilely. Cystoscopy was performed with the 30-degree lens video camera   through the 28-Turkmen continuous flow scope. Findings showed   trilobar obstruction. The bladder wall was relatively smooth. The ureteral orifices were well away from the bladder neck. Resection was begun on the right lobe of the prostate. This was   a very large gland and was vascular, which slowed things down   considerably, but after getting a decent channel opened up on   the right side the left side was addressed. This was carried out   to the verumontanum. Upon completion of the procedure, we   certainly had a good channel through the prostate with no   evidence of external sphincter or ureteral orifice injury;   however, there is definite residual tissue remaining as the   prostate is quite large.  It was vascular which slowed things down   and due to the fact that we had already been resecting well over   an hour and this 80year-old gentleman, the decision was made to   stop at this point. Hemostasis was obtained. All chips were   evacuated with the [facility] evacuator. I would estimate 75 to 80   grams of tissue resected. SPECIMENS TO PATHOLOGY: Prostate tissue. ESTIMATED BLOOD LOSS: I would estimate the blood loss at 200 to   250 mL. COMPLICATIONS: There were no apparent complications. DRAINS: A 24-Tajik 3-way catheter was inserted into the bladder   and irrigated until relatively clear, then was connected to   continuous irrigation and at the completion of the procedure   this had cleared up.          Tanda Burkitt, MD Rosena Ades / TEJAS   D:  06/16/2017   10:17   T:  06/16/2017   10:53   Job #:  [id number]

## 2017-06-16 NOTE — ANESTHESIA POSTPROCEDURE EVALUATION
Post-Anesthesia Evaluation and Assessment    Patient: Famiila Victor MRN: [id number]  SSN: [id number]    YOB: 1932  Age: 80 y.o. Sex: male       Cardiovascular Function/Vital Signs  Visit Vitals    /62    Pulse 92    Temp 36.8 °C (98.3 °F)    Resp 16    Ht 6' (1.829 m)    Wt 81.6 kg (180 lb)    SpO2 93%    BMI 24.41 kg/m2       Patient is status post general anesthesia for Procedure(s):  TRANSURETHRAL RESECTION OF PROSTATE. Nausea/Vomiting: None    Postoperative hydration reviewed and adequate. Pain:  Pain Scale 1: Numeric (0 - 10) (06/16/17 1037)  Pain Intensity 1: 0 (06/16/17 1037)   Managed    Neurological Status:   Neuro (WDL): Within Defined Limits (06/16/17 1037)  Neuro  LUE Motor Response: Purposeful (06/16/17 1037)  LLE Motor Response: Purposeful (06/16/17 1037)  RUE Motor Response: Purposeful (06/16/17 1037)  RLE Motor Response: Purposeful (06/16/17 1037)   At baseline    Mental Status and Level of Consciousness: Arousable    Pulmonary Status:   O2 Device: Nasal airway (06/16/17 1102)   Adequate oxygenation and airway patent    Complications related to anesthesia: None    Post-anesthesia assessment completed.  No concerns    Signed By: Evans Payton MD     June 16, 2017

## 2017-06-16 NOTE — PROGRESS NOTES
TRANSFER - OUT REPORT:    Verbal report given to Antonia Rees  being transferred to [address] [place] [address]  for routine post - op       Report consisted of patients Situation, Background, Assessment and   Recommendations(SBAR). Information from the following report(s) SBAR was reviewed with the receiving nurse. Lines:   Peripheral IV 06/16/17 Left Arm (Active)   Site Assessment Clean, dry, & intact 6/16/2017 10:11 AM   Phlebitis Assessment 0 6/16/2017 10:11 AM   Infiltration Assessment 0 6/16/2017 10:11 AM   Dressing Status Clean, dry, & intact 6/16/2017 10:11 AM   Dressing Type Tape;Transparent 6/16/2017 10:11 AM   Hub Color/Line Status Green; Infusing 6/16/2017 10:11 AM   Action Taken Blood drawn 6/16/2017  6:00 AM        Opportunity for questions and clarification was provided.       Patient transported with:   O2 @ 2 liters

## 2017-06-16 NOTE — PROGRESS NOTES
TRANSFER - IN REPORT:    Verbal report received from [facility], RN on Gaye Salines  being received from PACU for routine post - op      Report consisted of patients Situation, Background, Assessment and   Recommendations(SBAR). Information from the following report(s) SBAR, Kardex, OR Summary, Intake/Output and Recent Results was reviewed with the receiving nurse. Opportunity for questions and clarification was provided. Assessment completed upon patients arrival to unit and care assumed.

## 2017-06-16 NOTE — PROGRESS NOTES
Pt to room 607 via stretcher from PACU. Pt and family oriented to room and call light, verbalized understanding. Shah draining red urine. Dual skin assessment done by this RN and Dony Beckford RN. No breakdown noted. All needs met at this time.

## 2017-06-17 LAB
HCT VFR BLD AUTO: 34.4 % (ref 41.1–50.3)
HGB BLD-MCNC: 11.5 G/DL (ref 13.6–17.2)

## 2017-06-17 PROCEDURE — 36415 COLL VENOUS BLD VENIPUNCTURE: CPT | Performed by: UROLOGY

## 2017-06-17 PROCEDURE — 74011250637 HC RX REV CODE- 250/637: Performed by: UROLOGY

## 2017-06-17 PROCEDURE — 94760 N-INVAS EAR/PLS OXIMETRY 1: CPT

## 2017-06-17 PROCEDURE — 85018 HEMOGLOBIN: CPT | Performed by: UROLOGY

## 2017-06-17 PROCEDURE — 77030018836 HC SOL IRR NACL ICUM -A

## 2017-06-17 PROCEDURE — 99218 HC RM OBSERVATION: CPT

## 2017-06-17 RX ORDER — DIPHENHYDRAMINE HCL 25 MG
25 CAPSULE ORAL
Status: DISCONTINUED | OUTPATIENT
Start: 2017-06-17 | End: 2017-06-18

## 2017-06-17 RX ADMIN — METOPROLOL SUCCINATE 25 MG: 25 TABLET, EXTENDED RELEASE ORAL at 16:35

## 2017-06-17 RX ADMIN — Medication 5 ML: at 22:24

## 2017-06-17 RX ADMIN — FINASTERIDE 5 MG: 5 TABLET, FILM COATED ORAL at 08:17

## 2017-06-17 RX ADMIN — DOCUSATE SODIUM 100 MG: 100 CAPSULE, LIQUID FILLED ORAL at 16:35

## 2017-06-17 RX ADMIN — Medication 10 ML: at 05:18

## 2017-06-17 RX ADMIN — COLESEVELAM 625 MG: 180 TABLET ORAL at 08:20

## 2017-06-17 RX ADMIN — PANTOPRAZOLE SODIUM 40 MG: 40 TABLET, DELAYED RELEASE ORAL at 05:18

## 2017-06-17 RX ADMIN — DOCUSATE SODIUM 100 MG: 100 CAPSULE, LIQUID FILLED ORAL at 08:17

## 2017-06-17 RX ADMIN — COLESEVELAM 625 MG: 180 TABLET ORAL at 16:37

## 2017-06-17 RX ADMIN — Medication 10 ML: at 15:44

## 2017-06-17 RX ADMIN — LISINOPRIL AND HYDROCHLOROTHIAZIDE 1 TABLET: 12.5; 1 TABLET ORAL at 08:17

## 2017-06-17 RX ADMIN — COLESEVELAM: 180 TABLET ORAL at 22:24

## 2017-06-17 RX ADMIN — ACETAMINOPHEN 650 MG: 325 TABLET, FILM COATED ORAL at 17:04

## 2017-06-17 RX ADMIN — TAMSULOSIN HYDROCHLORIDE 0.4 MG: 0.4 CAPSULE ORAL at 22:24

## 2017-06-17 NOTE — PROGRESS NOTES
Pt had a good , was up several times , ambulate in the room . Irrigation is medium rate with peach Color. Pt with mild back pain.

## 2017-06-17 NOTE — PROGRESS NOTES
Patient feels well  AFVSS  Urine clearing  H/H11.5/34.4  Imp: Pt with large vascular prostate who states he tends to bleed easily  Plan: Continue CBI

## 2017-06-18 PROBLEM — Z90.79 S/P TURP: Status: ACTIVE | Noted: 2017-06-18

## 2017-06-18 LAB
HCT VFR BLD AUTO: 34.8 % (ref 41.1–50.3)
HGB BLD-MCNC: 11.5 G/DL (ref 13.6–17.2)

## 2017-06-18 PROCEDURE — 99218 HC RM OBSERVATION: CPT

## 2017-06-18 PROCEDURE — 74011000258 HC RX REV CODE- 258: Performed by: UROLOGY

## 2017-06-18 PROCEDURE — 74011250636 HC RX REV CODE- 250/636: Performed by: UROLOGY

## 2017-06-18 PROCEDURE — 94760 N-INVAS EAR/PLS OXIMETRY 1: CPT

## 2017-06-18 PROCEDURE — 77030018836 HC SOL IRR NACL ICUM -A

## 2017-06-18 PROCEDURE — 74011250637 HC RX REV CODE- 250/637: Performed by: UROLOGY

## 2017-06-18 PROCEDURE — 36415 COLL VENOUS BLD VENIPUNCTURE: CPT | Performed by: UROLOGY

## 2017-06-18 PROCEDURE — 65270000029 HC RM PRIVATE

## 2017-06-18 PROCEDURE — 85018 HEMOGLOBIN: CPT | Performed by: UROLOGY

## 2017-06-18 RX ORDER — DEXTROSE MONOHYDRATE AND SODIUM CHLORIDE 5; .9 G/100ML; G/100ML
75 INJECTION, SOLUTION INTRAVENOUS CONTINUOUS
Status: DISCONTINUED | OUTPATIENT
Start: 2017-06-18 | End: 2017-06-19 | Stop reason: HOSPADM

## 2017-06-18 RX ORDER — METOPROLOL SUCCINATE 50 MG/1
25 TABLET, EXTENDED RELEASE ORAL
Status: DISCONTINUED | OUTPATIENT
Start: 2017-06-19 | End: 2017-06-19 | Stop reason: HOSPADM

## 2017-06-18 RX ORDER — ZOLPIDEM TARTRATE 5 MG/1
5 TABLET ORAL
Status: DISCONTINUED | OUTPATIENT
Start: 2017-06-18 | End: 2017-06-19 | Stop reason: HOSPADM

## 2017-06-18 RX ORDER — LISINOPRIL AND HYDROCHLOROTHIAZIDE 10; 12.5 MG/1; MG/1
1 TABLET ORAL
Status: DISCONTINUED | OUTPATIENT
Start: 2017-06-19 | End: 2017-06-19 | Stop reason: HOSPADM

## 2017-06-18 RX ADMIN — SODIUM CHLORIDE 500 ML: 900 INJECTION, SOLUTION INTRAVENOUS at 09:59

## 2017-06-18 RX ADMIN — PANTOPRAZOLE SODIUM 40 MG: 40 TABLET, DELAYED RELEASE ORAL at 06:04

## 2017-06-18 RX ADMIN — DOCUSATE SODIUM 100 MG: 100 CAPSULE, LIQUID FILLED ORAL at 17:55

## 2017-06-18 RX ADMIN — DOCUSATE SODIUM 100 MG: 100 CAPSULE, LIQUID FILLED ORAL at 08:09

## 2017-06-18 RX ADMIN — FINASTERIDE 5 MG: 5 TABLET, FILM COATED ORAL at 08:09

## 2017-06-18 RX ADMIN — Medication 10 ML: at 08:12

## 2017-06-18 RX ADMIN — LISINOPRIL AND HYDROCHLOROTHIAZIDE 1 TABLET: 12.5; 1 TABLET ORAL at 06:51

## 2017-06-18 RX ADMIN — ACETAMINOPHEN 650 MG: 325 TABLET, FILM COATED ORAL at 20:43

## 2017-06-18 RX ADMIN — TAMSULOSIN HYDROCHLORIDE 0.4 MG: 0.4 CAPSULE ORAL at 21:35

## 2017-06-18 RX ADMIN — COLESEVELAM 625 MG: 180 TABLET ORAL at 08:10

## 2017-06-18 RX ADMIN — DEXTROSE MONOHYDRATE AND SODIUM CHLORIDE 75 ML/HR: 5; .9 INJECTION, SOLUTION INTRAVENOUS at 12:01

## 2017-06-18 RX ADMIN — COLESEVELAM 625 MG: 180 TABLET ORAL at 16:04

## 2017-06-18 RX ADMIN — Medication 10 ML: at 21:37

## 2017-06-18 RX ADMIN — COLESEVELAM: 180 TABLET ORAL at 22:00

## 2017-06-18 RX ADMIN — ZOLPIDEM TARTRATE 5 MG: 5 TABLET ORAL at 20:43

## 2017-06-18 RX ADMIN — Medication 10 ML: at 14:00

## 2017-06-18 NOTE — PROGRESS NOTES
Pt had good day . Urine is pink in color , blood  pressure is  126/52 and no blood pressure medication is given at this time. pt blood pressure medication was change due to pt stating he takes metoprolol during the day and lisinopril- hydrochlorothiazide at night. 5% of normal saline is started.

## 2017-06-18 NOTE — PROGRESS NOTES
Pt denies significant pain--had a chip occlude the catheter earlier  BP currently-121/56 after IV fluid challenge--was 82/39  Pt states his BP is ofen labile at home  Urine clearing with CBI  H/H-11.5/34.8  Imp: Urine is clearing in this patient with a large vascular TURP          BP improved with fluids  Plan: Observe another day--IVF--continue CBI            [id number]---[place] Summary--> 15 minutes spent in discharge planning

## 2017-06-18 NOTE — PROGRESS NOTES
Blood pressure is low , Dr Keron Nye is notify . Dr Keron Nye, will be here to check on the patient.

## 2017-06-18 NOTE — PROGRESS NOTES
Evans with CBI patent and draining clear cherry red entire shift until this am. Hand irrigated x2; large amount of small clots; evans patent and draining at this time.

## 2017-06-19 VITALS
SYSTOLIC BLOOD PRESSURE: 138 MMHG | WEIGHT: 180 LBS | BODY MASS INDEX: 24.38 KG/M2 | HEIGHT: 72 IN | RESPIRATION RATE: 18 BRPM | OXYGEN SATURATION: 97 % | DIASTOLIC BLOOD PRESSURE: 74 MMHG | TEMPERATURE: 98.1 F | HEART RATE: 62 BPM

## 2017-06-19 PROCEDURE — 51700 IRRIGATION OF BLADDER: CPT

## 2017-06-19 PROCEDURE — 74011000258 HC RX REV CODE- 258: Performed by: UROLOGY

## 2017-06-19 PROCEDURE — 74011250637 HC RX REV CODE- 250/637: Performed by: UROLOGY

## 2017-06-19 PROCEDURE — 77030018836 HC SOL IRR NACL ICUM -A

## 2017-06-19 RX ORDER — FINASTERIDE 5 MG/1
5 TABLET, FILM COATED ORAL DAILY
Qty: 10 TAB | Refills: 0 | Status: SHIPPED | OUTPATIENT
Start: 2017-06-19

## 2017-06-19 RX ORDER — PHENAZOPYRIDINE HYDROCHLORIDE 200 MG/1
200 TABLET, FILM COATED ORAL
Qty: 9 TAB | Refills: 0 | Status: SHIPPED | OUTPATIENT
Start: 2017-06-19 | End: 2017-06-22

## 2017-06-19 RX ADMIN — COLESEVELAM 625 MG: 180 TABLET ORAL at 09:00

## 2017-06-19 RX ADMIN — ACETAMINOPHEN 500 MG: 325 TABLET, FILM COATED ORAL at 08:04

## 2017-06-19 RX ADMIN — DEXTROSE MONOHYDRATE AND SODIUM CHLORIDE 75 ML/HR: 5; .9 INJECTION, SOLUTION INTRAVENOUS at 02:00

## 2017-06-19 RX ADMIN — PANTOPRAZOLE SODIUM 40 MG: 40 TABLET, DELAYED RELEASE ORAL at 05:28

## 2017-06-19 RX ADMIN — METOPROLOL SUCCINATE 25 MG: 50 TABLET, EXTENDED RELEASE ORAL at 08:06

## 2017-06-19 RX ADMIN — DOCUSATE SODIUM 100 MG: 100 CAPSULE, LIQUID FILLED ORAL at 08:06

## 2017-06-19 RX ADMIN — FINASTERIDE 5 MG: 5 TABLET, FILM COATED ORAL at 08:07

## 2017-06-19 RX ADMIN — Medication 10 ML: at 05:30

## 2017-06-19 NOTE — PROGRESS NOTES
Pt rested well with no complaints. Tylenol given once for pain during the night. Cbi running at a moderate gtt, urine remains pink.

## 2017-06-19 NOTE — DISCHARGE SUMMARY
[address] SUMMARY       Name:  Deborah Mtz   MR#:  [id number]   :  1932   Account #:  [de-identified]   Date of Adm:  2017       DISCHARGE DIAGNOSES   1. Urinary retention. 2. History of colon cancer. 3. Elevated prostate-specific antigen. 4. Dyslipidemia. 5. Hypertension. HISTORY OF PRESENT ILLNESS: The patient is an 15-year-old white   male with urinary retention. He has had increasing lower urinary   tract symptoms, but developed urinary retention recently. He   underwent cystoscopy which showed a very large prostate and   recommendation was for TURP. Risks, benefits, and alternatives   were fully discussed. PAST MEDICAL HISTORY: Significant for the aforementioned medical   problems. PAST SURGICAL HISTORY: His previous surgeries include removal of   a skin cancer lesion and removal of a colon polyp. MEDICATIONS PRIOR TO ADMISSION   1. Aspirin 81 mg daily. 2. Ampicillin 500 mg t.i.d.   3. Flomax 0.4 mg daily. 4. Proventil inhaler 2 puffs as needed. 5. Prilosec 20 mg daily. 6. Zestoretic 10/12.5 daily. 7. Metoprolol 25 mg daily. ALLERGIES: THE PATIENT HAS NO ALLERGIES. SOCIAL HISTORY: The patient is . He is a former smoker. He does not use ethanol. FAMILY HISTORY: Positive for lung carcinoma in his father and   colon carcinoma in his mother. PHYSICAL EXAMINATION   GENERAL: Revealed an alert, oriented, pleasant, white male with   a very enlarged prostate. CARDIOPULMONARY: Exam was unremarkable. HOSPITAL COURSE: The patient was brought to the operating room,   on 2017, and underwent a difficult TURP as the gland was   quite large and quite vascular. The patient has required CBI and   kept over an additional day. He also had an episode of   hypotension which he states he has at home on occasion. He is   feeling well now. The urine is clear at this point. Pathology is   pending.  The plans are to remove his catheter and discharge him   home today. He is discharged on his usual medications, except we   will hold the fish oil and hold the aspirin for at least a week. FOLLOWUP: He will follow up in 3 weeks. DISCHARGE DIET: He is discharged on his usual diet. ACTIVITIES: He is discharged on limited activity for the next   month. DISCHARGE MEDICATIONS:   1. He will be discharged on 10 days of Proscar to retard   bleeding, 5 mg daily. 2. Pyridium 200 mg t.i.d. p.r.n. dysuria.           MD Rene Acosta / TEJAS   D:  06/19/2017   07:48   T:  06/19/2017   11:29   Job #:  [id number]

## 2017-06-19 NOTE — PROGRESS NOTES
Patient feels well  AFVSS  Urine clear  Imp: Stable after large TURP  Plan: Remove evans--discharge home    > 15 minutes spent in discharge planning

## 2017-06-19 NOTE — CDMP QUERY
Please clarify if this patient is being treated/managed for:    ACUTE BLOOD LOSS ANEMIA status post TURP with 200 ml blood loss requiring IVF bolus and increased monitoring and serial HGB checks. =>Other Explanation of clinical findings  =>Unable to Determine (no explanation of clinical findings)    The medical record reflects the following:    Risk Factors: TURP, 200 ml blood loss in procedure    Clinical Indicators: Hgb drop 14.3 to 11.5, BP 82/39    Treatment: IVF bolus, serial H/H, increased monitoring for bleeding    Please clarify and document your clinical opinion in the progress notes and discharge summary including the definitive and/or presumptive diagnosis, (suspected or probable), related to the above clinical findings. Please include clinical findings supporting your diagnosis.     Thanks,  Esteban De La Fuente RN, CDS  Compliant Documentation Management Program  [phone]

## 2017-06-19 NOTE — DISCHARGE INSTRUCTIONS
DISCHARGE SUMMARY from Nurse    The following personal items are in your possession at time of discharge:    Dental Appliances: Uppers  Visual Aid: None     Home Medications: None  Jewelry: None  Clothing: Shirt, Pants, Footwear, Undergarments  Other Valuables: None             PATIENT INSTRUCTIONS:    After general anesthesia or intravenous sedation, for 24 hours or while taking prescription Narcotics:  · Limit your activities  · Do not drive and operate hazardous machinery  · Do not make important personal or business decisions  · Do  not drink alcoholic beverages  · If you have not urinated within 8 hours after discharge, please contact your surgeon on call. Report the following to your surgeon:  · Excessive pain, swelling, redness or odor of or around the surgical area  · Temperature over 100.5  · Nausea and vomiting lasting longer than 4 hours or if unable to take medications  · Any signs of decreased circulation or nerve impairment to extremity: change in color, persistent  numbness, tingling, coldness or increase pain  · Any questions        What to do at Home:  Recommended activity: No lifting, Driving, or Strenuous exercise for 2 weeks. If you experience any of the following symptoms difficulty urinating, fever greater than 101, increased pain, or nausea/vomiting, please follow up with Dr. Ashley Sandhu. *  Please give a list of your current medications to your Primary Care Provider. *  Please update this list whenever your medications are discontinued, doses are      changed, or new medications (including over-the-counter products) are added. *  Please carry medication information at all times in case of emergency situations. These are general instructions for a healthy lifestyle:    No smoking/ No tobacco products/ Avoid exposure to second hand smoke    Surgeon General's Warning:  Quitting smoking now greatly reduces serious risk to your health.     Obesity, smoking, and sedentary lifestyle greatly increases your risk for illness    A healthy diet, regular physical exercise & weight monitoring are important for maintaining a healthy lifestyle    You may be retaining fluid if you have a history of heart failure or if you experience any of the following symptoms:  Weight gain of 3 pounds or more overnight or 5 pounds in a week, increased swelling in our hands or feet or shortness of breath while lying flat in bed. Please call your doctor as soon as you notice any of these symptoms; do not wait until your next office visit. Recognize signs and symptoms of STROKE:    F-face looks uneven    A-arms unable to move or move unevenly    S-speech slurred or non-existent    T-time-call 911 as soon as signs and symptoms begin-DO NOT go       Back to bed or wait to see if you get better-TIME IS BRAIN. Warning Signs of HEART ATTACK     Call 911 if you have these symptoms:   Chest discomfort. Most heart attacks involve discomfort in the center of the chest that lasts more than a few minutes, or that goes away and comes back. It can feel like uncomfortable pressure, squeezing, fullness, or pain.  Discomfort in other areas of the upper body. Symptoms can include pain or discomfort in one or both arms, the back, neck, jaw, or stomach.  Shortness of breath with or without chest discomfort.  Other signs may include breaking out in a cold sweat, nausea, or lightheadedness. Don't wait more than five minutes to call 911 - MINUTES MATTER! Fast action can save your life. Calling 911 is almost always the fastest way to get lifesaving treatment. Emergency Medical Services staff can begin treatment when they arrive -- up to an hour sooner than if someone gets to the hospital by car. The discharge information has been reviewed with the patient. The patient verbalized understanding.     Discharge medications reviewed with the patient and appropriate educational materials and side effects teaching were provided.

## 2017-06-19 NOTE — PROGRESS NOTES
Discharge instructions and prescriptions given and reviewed with pt, verbalizes understanding, medication side effect sheet reviewed with pt, pt to be discharged home when voiding well.

## 2017-10-21 NOTE — OP NOTES
[facility]   OPERATIVE REPORT       Name:  Kip Devine   MR#:  [id number]   :  1932   Account #:  [de-identified]   Date of Adm:  2017       DATE OF PROCEDURE 2017    PREOPERATIVE DIAGNOSIS: Urinary retention with benign prostatic   hypertrophy. POSTOPERATIVE DIAGNOSIS: Urinary retention with benign prostatic   hypertrophy. PROCEDURE PERFORMED: TURP. SURGEON: Patricia Ramírez MD    ANESTHESIA: General.    INDICATIONS FOR PROCEDURE: The patient is an 80-year-old white   male with urinary retention and findings of a very large   prostate. Recommendation was for attempt at TURP. Risks,   benefits, and alternatives were discussed in detail. The patient   stated that he understood and wished to proceed. DESCRIPTION OF PROCEDURE: The patient received preoperative   antibiotics consisting of Cipro 400 mg IV piggyback. General   anesthesia was obtained. The patient was placed in the   dorsolithotomy position, prepped and draped sterilely. Cystoscopy was performed with the 30-degree lens video camera   through the 28-Kinyarwanda continuous flow scope. Findings showed   trilobar obstruction. The bladder wall was relatively smooth. The ureteral orifices were well away from the bladder neck. Resection was begun on the right lobe of the prostate. This was   a very large gland and was vascular, which slowed things down   considerably, but after getting a decent channel opened up on   the right side the left side was addressed. This was carried out   to the verumontanum. Upon completion of the procedure, we   certainly had a good channel through the prostate with no   evidence of external sphincter or ureteral orifice injury;   however, there is definite residual tissue remaining as the   prostate is quite large.  It was vascular which slowed things down   and due to the fact that we had already been resecting well over   an hour and this 80year-old gentleman, the decision was made to   stop at this point. Hemostasis was obtained. All chips were   evacuated with the [facility] evacuator. I would estimate 75 to 80   grams of tissue resected. SPECIMENS TO PATHOLOGY: Prostate tissue. ESTIMATED BLOOD LOSS: I would estimate the blood loss at 200 to   250 mL. COMPLICATIONS: There were no apparent complications. DRAINS: A 24-[place] 3-way catheter was inserted into the bladder   and irrigated until relatively clear, then was connected to   continuous irrigation and at the completion of the procedure   this had cleared up.          MD Cortney Cortez   D:  06/16/2017   10:17   T:  06/16/2017   10:53   Job #:  [id number]

## (undated) DEVICE — KENDALL SCD EXPRESS SLEEVES, KNEE LENGTH, MEDIUM: Brand: KENDALL SCD

## (undated) DEVICE — CONTAINER SPEC FRMLN 120ML --

## (undated) DEVICE — REM POLYHESIVE ADULT PATIENT RETURN ELECTRODE: Brand: VALLEYLAB

## (undated) DEVICE — CATHETER URETH 24FR BLLN 30CC STD LTX 3 W TWO OPP DRNGE EYE

## (undated) DEVICE — MEDI-VAC NON-CONDUCTIVE SUCTION TUBING: Brand: CARDINAL HEALTH

## (undated) DEVICE — Y-TYPE TUR/BLADDER IRRIGATION SET, REGULATING CLAMP

## (undated) DEVICE — SINGLE PORT MANIFOLD: Brand: NEPTUNE 2

## (undated) DEVICE — SOLUTION IRRIG 3000ML 0.9% SOD CHL FLX CONT 0797208] ICU MEDICAL INC]

## (undated) DEVICE — TRAY PREP DRY W/ PREM GLV 2 APPL 6 SPNG 2 UNDPD 1 OVERWRAP

## (undated) DEVICE — SOL IRR GLYC 1.5 % 3000ML --

## (undated) DEVICE — DEVICE STBL AD TRICOT ANCHR PD FOR 3 W F CATH STATLOK

## (undated) DEVICE — SOLUTION IV STRL H2O 500 ML AQUALITE POUR BTL

## (undated) DEVICE — ELECTRD CUT LP ANG 27FR BRN

## (undated) DEVICE — GUARDIAN LVC: Brand: GUARDIAN

## (undated) DEVICE — BAG DRNGE 4000ML CONT IRRIG ROUNDED TEARDROP SHP DISP

## (undated) DEVICE — PACK PROCEDURE SURG TRANSURETHRAL RESECT OF PROST CDS